# Patient Record
Sex: FEMALE | Race: WHITE
[De-identification: names, ages, dates, MRNs, and addresses within clinical notes are randomized per-mention and may not be internally consistent; named-entity substitution may affect disease eponyms.]

---

## 2018-10-09 ENCOUNTER — HOSPITAL ENCOUNTER (EMERGENCY)
Dept: HOSPITAL 47 - EC | Age: 33
Discharge: HOME | End: 2018-10-09
Payer: COMMERCIAL

## 2018-10-09 VITALS
HEART RATE: 58 BPM | SYSTOLIC BLOOD PRESSURE: 102 MMHG | TEMPERATURE: 96.8 F | DIASTOLIC BLOOD PRESSURE: 56 MMHG | RESPIRATION RATE: 16 BRPM

## 2018-10-09 DIAGNOSIS — Z87.42: ICD-10-CM

## 2018-10-09 DIAGNOSIS — L30.9: ICD-10-CM

## 2018-10-09 DIAGNOSIS — Z88.2: ICD-10-CM

## 2018-10-09 DIAGNOSIS — N64.52: Primary | ICD-10-CM

## 2018-10-09 DIAGNOSIS — F17.200: ICD-10-CM

## 2018-10-09 DIAGNOSIS — M79.662: ICD-10-CM

## 2018-10-09 DIAGNOSIS — Z90.710: ICD-10-CM

## 2018-10-09 DIAGNOSIS — Z88.1: ICD-10-CM

## 2018-10-09 LAB
ANION GAP SERPL CALC-SCNC: 10 MMOL/L
BASOPHILS # BLD AUTO: 0 K/UL (ref 0–0.2)
BASOPHILS NFR BLD AUTO: 0 %
BUN SERPL-SCNC: 12 MG/DL (ref 7–17)
CALCIUM SPEC-MCNC: 10.1 MG/DL (ref 8.4–10.2)
CHLORIDE SERPL-SCNC: 109 MMOL/L (ref 98–107)
CO2 SERPL-SCNC: 23 MMOL/L (ref 22–30)
EOSINOPHIL # BLD AUTO: 0.2 K/UL (ref 0–0.7)
EOSINOPHIL NFR BLD AUTO: 3 %
ERYTHROCYTE [DISTWIDTH] IN BLOOD BY AUTOMATED COUNT: 4.73 M/UL (ref 3.8–5.4)
ERYTHROCYTE [DISTWIDTH] IN BLOOD: 13.7 % (ref 11.5–15.5)
GLUCOSE SERPL-MCNC: 86 MG/DL (ref 74–99)
HCT VFR BLD AUTO: 42.1 % (ref 34–46)
HGB BLD-MCNC: 13.8 GM/DL (ref 11.4–16)
HYALINE CASTS UR QL AUTO: 2 /LPF (ref 0–2)
LYMPHOCYTES # SPEC AUTO: 2.5 K/UL (ref 1–4.8)
LYMPHOCYTES NFR SPEC AUTO: 48 %
MCH RBC QN AUTO: 29.2 PG (ref 25–35)
MCHC RBC AUTO-ENTMCNC: 32.8 G/DL (ref 31–37)
MCV RBC AUTO: 89 FL (ref 80–100)
MONOCYTES # BLD AUTO: 0.3 K/UL (ref 0–1)
MONOCYTES NFR BLD AUTO: 6 %
NEUTROPHILS # BLD AUTO: 2.2 K/UL (ref 1.3–7.7)
NEUTROPHILS NFR BLD AUTO: 42 %
PH UR: 7 [PH] (ref 5–8)
PLATELET # BLD AUTO: 255 K/UL (ref 150–450)
POTASSIUM SERPL-SCNC: 4.2 MMOL/L (ref 3.5–5.1)
RBC UR QL: <1 /HPF (ref 0–5)
SODIUM SERPL-SCNC: 142 MMOL/L (ref 137–145)
SP GR UR: 1 (ref 1–1.03)
SQUAMOUS UR QL AUTO: 11 /HPF (ref 0–4)
UROBILINOGEN UR QL STRIP: <2 MG/DL (ref ?–2)
WBC # BLD AUTO: 5.2 K/UL (ref 3.8–10.6)
WBC #/AREA URNS HPF: 2 /HPF (ref 0–5)

## 2018-10-09 PROCEDURE — 87070 CULTURE OTHR SPECIMN AEROBIC: CPT

## 2018-10-09 PROCEDURE — 81001 URINALYSIS AUTO W/SCOPE: CPT

## 2018-10-09 PROCEDURE — 80306 DRUG TEST PRSMV INSTRMNT: CPT

## 2018-10-09 PROCEDURE — 96372 THER/PROPH/DIAG INJ SC/IM: CPT

## 2018-10-09 PROCEDURE — 93971 EXTREMITY STUDY: CPT

## 2018-10-09 PROCEDURE — 87205 SMEAR GRAM STAIN: CPT

## 2018-10-09 PROCEDURE — 99284 EMERGENCY DEPT VISIT MOD MDM: CPT

## 2018-10-09 PROCEDURE — 36415 COLL VENOUS BLD VENIPUNCTURE: CPT

## 2018-10-09 PROCEDURE — 80048 BASIC METABOLIC PNL TOTAL CA: CPT

## 2018-10-09 PROCEDURE — 81025 URINE PREGNANCY TEST: CPT

## 2018-10-09 PROCEDURE — 85025 COMPLETE CBC W/AUTO DIFF WBC: CPT

## 2018-10-09 PROCEDURE — 84146 ASSAY OF PROLACTIN: CPT
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
## 2018-10-09 NOTE — US
EXAMINATION TYPE: US venous doppler duplex LE LT

 

DATE OF EXAM: 10/9/2018 3:36 PM

 

COMPARISON: NONE

 

CLINICAL HISTORY: Pain. discoloration and mild swelling on anterior left shin

 

SIDE PERFORMED: Left  

 

TECHNIQUE:  The lower extremity deep venous system is examined utilizing real time linear array sonog
tigist with graded compression, doppler sonography and color-flow sonography.

 

VESSELS IMAGED:

External Iliac Vein (EIV)

Common Femoral Vein

Deep Femoral Vein

Greater Saphenous Vein *

Femoral Vein

Popliteal Vein

Small Saphenous Vein *

Proximal Calf Veins

(* superficial vessels)

 

 

Left Leg:  Appears negative for DVT, scanned anterior left shin and no obvious abnormality seen. 

 

Grayscale, color doppler, spectral doppler imaging performed of the deep veins of the left lower extr
emity.  There is normal flow, compressibility, vascular waveforms.  Towards end of the exam anterior 
shin shows mild diffuse subcutaneous edema without suspicious focal fluid collection or mass.

 

 

IMPRESSION:  No ultrasound evidence for acute DVT in the left lower extremity.

## 2018-10-25 ENCOUNTER — HOSPITAL ENCOUNTER (EMERGENCY)
Dept: HOSPITAL 47 - EC | Age: 33
Discharge: HOME | End: 2018-10-25
Payer: COMMERCIAL

## 2018-10-25 VITALS — DIASTOLIC BLOOD PRESSURE: 64 MMHG | TEMPERATURE: 97.5 F | HEART RATE: 64 BPM | SYSTOLIC BLOOD PRESSURE: 102 MMHG

## 2018-10-25 VITALS — RESPIRATION RATE: 18 BRPM

## 2018-10-25 DIAGNOSIS — R52: ICD-10-CM

## 2018-10-25 DIAGNOSIS — G62.9: ICD-10-CM

## 2018-10-25 DIAGNOSIS — Z88.2: ICD-10-CM

## 2018-10-25 DIAGNOSIS — F17.200: ICD-10-CM

## 2018-10-25 DIAGNOSIS — Z88.6: ICD-10-CM

## 2018-10-25 DIAGNOSIS — R53.1: Primary | ICD-10-CM

## 2018-10-25 LAB
ANION GAP SERPL CALC-SCNC: 9 MMOL/L
BASOPHILS # BLD AUTO: 0.1 K/UL (ref 0–0.2)
BASOPHILS NFR BLD AUTO: 1 %
BUN SERPL-SCNC: 18 MG/DL (ref 7–17)
CALCIUM SPEC-MCNC: 10.1 MG/DL (ref 8.4–10.2)
CHLORIDE SERPL-SCNC: 105 MMOL/L (ref 98–107)
CO2 SERPL-SCNC: 28 MMOL/L (ref 22–30)
EOSINOPHIL # BLD AUTO: 0.1 K/UL (ref 0–0.7)
EOSINOPHIL NFR BLD AUTO: 1 %
ERYTHROCYTE [DISTWIDTH] IN BLOOD BY AUTOMATED COUNT: 4.72 M/UL (ref 3.8–5.4)
ERYTHROCYTE [DISTWIDTH] IN BLOOD: 13.2 % (ref 11.5–15.5)
GLUCOSE SERPL-MCNC: 115 MG/DL (ref 74–99)
HCT VFR BLD AUTO: 41.9 % (ref 34–46)
HGB BLD-MCNC: 13.9 GM/DL (ref 11.4–16)
LYMPHOCYTES # SPEC AUTO: 2.6 K/UL (ref 1–4.8)
LYMPHOCYTES NFR SPEC AUTO: 31 %
MAGNESIUM SPEC-SCNC: 2.1 MG/DL (ref 1.6–2.3)
MCH RBC QN AUTO: 29.4 PG (ref 25–35)
MCHC RBC AUTO-ENTMCNC: 33.1 G/DL (ref 31–37)
MCV RBC AUTO: 88.6 FL (ref 80–100)
MONOCYTES # BLD AUTO: 0.3 K/UL (ref 0–1)
MONOCYTES NFR BLD AUTO: 4 %
NEUTROPHILS # BLD AUTO: 5.3 K/UL (ref 1.3–7.7)
NEUTROPHILS NFR BLD AUTO: 63 %
PH UR: 6.5 [PH] (ref 5–8)
PLATELET # BLD AUTO: 239 K/UL (ref 150–450)
POTASSIUM SERPL-SCNC: 3.9 MMOL/L (ref 3.5–5.1)
SODIUM SERPL-SCNC: 142 MMOL/L (ref 137–145)
SP GR UR: 1.01 (ref 1–1.03)
UROBILINOGEN UR QL STRIP: <2 MG/DL (ref ?–2)
WBC # BLD AUTO: 8.4 K/UL (ref 3.8–10.6)

## 2018-10-25 PROCEDURE — 36415 COLL VENOUS BLD VENIPUNCTURE: CPT

## 2018-10-25 PROCEDURE — 99285 EMERGENCY DEPT VISIT HI MDM: CPT

## 2018-10-25 PROCEDURE — 80048 BASIC METABOLIC PNL TOTAL CA: CPT

## 2018-10-25 PROCEDURE — 83735 ASSAY OF MAGNESIUM: CPT

## 2018-10-25 PROCEDURE — 81003 URINALYSIS AUTO W/O SCOPE: CPT

## 2018-10-25 PROCEDURE — 81025 URINE PREGNANCY TEST: CPT

## 2018-10-25 PROCEDURE — 85025 COMPLETE CBC W/AUTO DIFF WBC: CPT

## 2018-10-25 NOTE — ED
General Adult HPI





- General


Chief complaint: Weakness


Stated complaint: weakness


Source: patient


Mode of arrival: wheelchair


Limitations: no limitations





- History of Present Illness


Initial comments: 





Dictation was produced using dragon dictation software. please excuse any 

grammatical, word or spelling errors. 





Chief Complaint: 32-year-old female past medical history of seizure disorder 

neuropathy presents with total body pain.





History of Present Illness: Patient is a 32-year-old female presents with total 

body pain.  Patient states she has past medical history of seizure and 

neuropathy.  Patient states she takes 100 mg of gabapentin 3 times a day and 

100 mg of Lamictal once a day.  Patient states she just moved from [place] 

2 weeks ago.  She states she has not had a chance to refill her medications.  

Patient denies any constitutional symptoms.








The ROS documented in this emergency department record has been reviewed and 

confirmed by me.  Those systems with pertinent positive or negative responses 

have been documented in the HPI.  All other systems are other negative and/or 

noncontributory.





- Related Data


 Previous Rx's











 Medication  Instructions  Recorded


 


Gabapentin 800 mg PO TID #12 tab 10/25/18


 


lamoTRIgine [LaMICtal] 100 mg PO BID #10 tab 10/25/18











 Allergies











Allergy/AdvReac Type Severity Reaction Status Date / Time


 


aspirin Allergy  Rash/Hives Verified 10/25/18 19:24


 


ibuprofen [From Motrin IB] Allergy  Rash/Hives Verified 10/25/18 19:24


 


ketorolac [From Toradol] Allergy  Rash/Hives Verified 10/25/18 19:24


 


sulfamethoxazole Allergy  Rash/Hives Verified 10/25/18 19:23





[From Bactrim]     


 


trimethoprim [From Bactrim] Allergy  Rash/Hives Verified 10/25/18 19:23














Review of Systems


ROS Statement: 


Those systems with pertinent positive or pertinent negative responses have been 

documented in the HPI.





ROS Other: All systems not noted in ROS Statement are negative.





Past Medical History


Past Medical History: Seizure Disorder


Additional Past Medical History / Comment(s): endometriosis chronic kidney 

infections


History of Any Multi-Drug Resistant Organisms: None Reported


Past Surgical History: Hysterectomy


Past Psychological History: No Psychological Hx Reported


Smoking Status: Current every day smoker


Past Alcohol Use History: Occasional


Past Drug Use History: Marijuana





General Exam





- General Exam Comments


Initial Comments: 














PHYSICAL EXAM:


General Impression: Alert and oriented x3, not in acute distress


HEENT: Normocephalic atraumatic, extra-ocular movements intact, pupils equal 

and reactive to light bilaterally, mucous membranes moist.


Cardiovascular: Heart regular rate and rhythm, S1&S2 audible, no murmurs, rubs 

or gallops


Chest: Lungs clear to auscultation bilaterally, no rhonchi, no wheeze, no rales


Abdomen: Bowel sounds present, abdomen soft, non-tender, non-distended, no 

organomegaly


Musculoskeletal: Pulses present and equal in all extremities, no peripheral 

edema


Motor: Power 5/5 bilaterally, no focal deficits noted


Neurological: CN II-XII grossly intact, no focal motor or sensory deficits noted


Skin: Intact with no visualized rashes


Psych: Normal affect and mood


Limitations: no limitations





Course





 Vital Signs











  10/25/18 10/25/18





  18:33 21:18


 


Temperature 98.4 F 98.1 F


 


Pulse Rate 84 67


 


Respiratory 16 18





Rate  


 


Blood Pressure 100/68 110/84


 


O2 Sat by Pulse 99 99





Oximetry  














Medical Decision Making





- Medical Decision Making











ED course: 33 Old female presents with whole body pain.  Vital signs upon 

arrival are within acceptable limits.  Patient's history of present illness is 

not suspicious for any life-threatening emergencies.  Hemodynamically stable on 

arrival.  Patient is well-appearing.  Laboratory evaluation obtained showing no 

acute processes.  Patient is not pregnant.  Patient's dictations refilled.  She 

is given referral to primary care physician and neurologist.








- Lab Data


Result diagrams: 


 10/25/18 19:40





 10/25/18 19:40





 Lab Results











  10/25/18 10/25/18 10/25/18 Range/Units





  19:40 19:40 20:37 


 


WBC   8.4   (3.8-10.6)  k/uL


 


RBC   4.72   (3.80-5.40)  m/uL


 


Hgb   13.9   (11.4-16.0)  gm/dL


 


Hct   41.9   (34.0-46.0)  %


 


MCV   88.6   (80.0-100.0)  fL


 


MCH   29.4   (25.0-35.0)  pg


 


MCHC   33.1   (31.0-37.0)  g/dL


 


RDW   13.2   (11.5-15.5)  %


 


Plt Count   239   (150-450)  k/uL


 


Neutrophils %   63   %


 


Lymphocytes %   31   %


 


Monocytes %   4   %


 


Eosinophils %   1   %


 


Basophils %   1   %


 


Neutrophils #   5.3   (1.3-7.7)  k/uL


 


Lymphocytes #   2.6   (1.0-4.8)  k/uL


 


Monocytes #   0.3   (0-1.0)  k/uL


 


Eosinophils #   0.1   (0-0.7)  k/uL


 


Basophils #   0.1   (0-0.2)  k/uL


 


Sodium  142    (137-145)  mmol/L


 


Potassium  3.9    (3.5-5.1)  mmol/L


 


Chloride  105    ()  mmol/L


 


Carbon Dioxide  28    (22-30)  mmol/L


 


Anion Gap  9    mmol/L


 


BUN  18 H    (7-17)  mg/dL


 


Creatinine  0.73    (0.52-1.04)  mg/dL


 


Est GFR (CKD-EPI)AfAm  >90    (>60 ml/min/1.73 sqM)  


 


Est GFR (CKD-EPI)NonAf  >90    (>60 ml/min/1.73 sqM)  


 


Glucose  115 H    (74-99)  mg/dL


 


Calcium  10.1    (8.4-10.2)  mg/dL


 


Magnesium  2.1    (1.6-2.3)  mg/dL


 


Urine Color     


 


Urine Appearance     (Clear)  


 


Urine pH     (5.0-8.0)  


 


Ur Specific Gravity     (1.001-1.035)  


 


Urine Protein     (Negative)  


 


Urine Glucose (UA)     (Negative)  


 


Urine Ketones     (Negative)  


 


Urine Blood     (Negative)  


 


Urine Nitrite     (Negative)  


 


Urine Bilirubin     (Negative)  


 


Urine Urobilinogen     (<2.0)  mg/dL


 


Ur Leukocyte Esterase     (Negative)  


 


Urine HCG, Qual    Not Detected  (Not Detectd)  














  10/25/18 Range/Units





  20:37 


 


WBC   (3.8-10.6)  k/uL


 


RBC   (3.80-5.40)  m/uL


 


Hgb   (11.4-16.0)  gm/dL


 


Hct   (34.0-46.0)  %


 


MCV   (80.0-100.0)  fL


 


MCH   (25.0-35.0)  pg


 


MCHC   (31.0-37.0)  g/dL


 


RDW   (11.5-15.5)  %


 


Plt Count   (150-450)  k/uL


 


Neutrophils %   %


 


Lymphocytes %   %


 


Monocytes %   %


 


Eosinophils %   %


 


Basophils %   %


 


Neutrophils #   (1.3-7.7)  k/uL


 


Lymphocytes #   (1.0-4.8)  k/uL


 


Monocytes #   (0-1.0)  k/uL


 


Eosinophils #   (0-0.7)  k/uL


 


Basophils #   (0-0.2)  k/uL


 


Sodium   (137-145)  mmol/L


 


Potassium   (3.5-5.1)  mmol/L


 


Chloride   ()  mmol/L


 


Carbon Dioxide   (22-30)  mmol/L


 


Anion Gap   mmol/L


 


BUN   (7-17)  mg/dL


 


Creatinine   (0.52-1.04)  mg/dL


 


Est GFR (CKD-EPI)AfAm   (>60 ml/min/1.73 sqM)  


 


Est GFR (CKD-EPI)NonAf   (>60 ml/min/1.73 sqM)  


 


Glucose   (74-99)  mg/dL


 


Calcium   (8.4-10.2)  mg/dL


 


Magnesium   (1.6-2.3)  mg/dL


 


Urine Color  Light Yellow  


 


Urine Appearance  Clear  (Clear)  


 


Urine pH  6.5  (5.0-8.0)  


 


Ur Specific Gravity  1.008  (1.001-1.035)  


 


Urine Protein  Negative  (Negative)  


 


Urine Glucose (UA)  Negative  (Negative)  


 


Urine Ketones  Negative  (Negative)  


 


Urine Blood  Negative  (Negative)  


 


Urine Nitrite  Negative  (Negative)  


 


Urine Bilirubin  Negative  (Negative)  


 


Urine Urobilinogen  <2.0  (<2.0)  mg/dL


 


Ur Leukocyte Esterase  Negative  (Negative)  


 


Urine HCG, Qual   (Not Detectd)  














Disposition


Clinical Impression: 


 Inadequate pain control





Disposition: HOME SELF-CARE


Condition: Good


Prescriptions: 


Gabapentin 800 mg PO TID #12 tab


lamoTRIgine [LaMICtal] 100 mg PO BID #10 tab


Is patient prescribed a controlled substance at d/c from ED?: No


Referrals: 


None,Stated [Primary Care Provider] - 1-2 days


Partha Duggan MD [REFERRING] - 1-2 days


Fer Brady MD [STAFF PHYSICIAN] - 1-2 days


Time of Disposition: 21:45

## 2018-12-24 ENCOUNTER — HOSPITAL ENCOUNTER (EMERGENCY)
Dept: HOSPITAL 47 - EC | Age: 33
Discharge: HOME | End: 2018-12-24
Payer: COMMERCIAL

## 2018-12-24 VITALS — SYSTOLIC BLOOD PRESSURE: 95 MMHG | RESPIRATION RATE: 18 BRPM | HEART RATE: 73 BPM | DIASTOLIC BLOOD PRESSURE: 57 MMHG

## 2018-12-24 VITALS — TEMPERATURE: 97.4 F

## 2018-12-24 DIAGNOSIS — J40: Primary | ICD-10-CM

## 2018-12-24 DIAGNOSIS — F17.200: ICD-10-CM

## 2018-12-24 DIAGNOSIS — Z88.2: ICD-10-CM

## 2018-12-24 DIAGNOSIS — J32.9: ICD-10-CM

## 2018-12-24 DIAGNOSIS — Z88.6: ICD-10-CM

## 2018-12-24 LAB
PH UR: 6 [PH] (ref 5–8)
SP GR UR: 1.02 (ref 1–1.03)
UROBILINOGEN UR QL STRIP: <2 MG/DL (ref ?–2)

## 2018-12-24 PROCEDURE — 81003 URINALYSIS AUTO W/O SCOPE: CPT

## 2018-12-24 PROCEDURE — 99284 EMERGENCY DEPT VISIT MOD MDM: CPT

## 2018-12-24 PROCEDURE — 94640 AIRWAY INHALATION TREATMENT: CPT

## 2018-12-24 PROCEDURE — 87502 INFLUENZA DNA AMP PROBE: CPT

## 2018-12-24 PROCEDURE — 71046 X-RAY EXAM CHEST 2 VIEWS: CPT

## 2018-12-24 PROCEDURE — 76857 US EXAM PELVIC LIMITED: CPT

## 2018-12-24 PROCEDURE — 74018 RADEX ABDOMEN 1 VIEW: CPT

## 2018-12-24 PROCEDURE — 87081 CULTURE SCREEN ONLY: CPT

## 2018-12-24 PROCEDURE — 87430 STREP A AG IA: CPT

## 2018-12-24 PROCEDURE — 87086 URINE CULTURE/COLONY COUNT: CPT

## 2018-12-24 NOTE — XR
EXAMINATION TYPE: XR KUB

 

DATE OF EXAM: 12/24/2018

 

CLINICAL DATA:  33-year-old female with pain, PHH

 

COMPARISON:  None

 

FINDINGS:

 

Lung bases are clear. 

 

No evidence for free intraperitoneal air. 

 

No dilated small bowel or air-fluid levels. Scattered air and stool seen throughout the colon extendi
ng distally into the rectum. Mild overall stool burden. 

 

No suspicious calcifications identified.

 

 

 

IMPRESSION:

 

Mild overall stool burden. No evidence of bowel obstruction or free intraperitoneal air.

## 2018-12-24 NOTE — XR
EXAMINATION TYPE: XR chest 2V

 

DATE OF EXAM: 12/24/2018

 

COMPARISON: None

 

HISTORY: 33-year-old female with cough

 

TECHNIQUE:  PA and lateral views

 

FINDINGS:  

The cardiomediastinal silhouette, aorta, and pulmonary vasculature are within normal limits. Minimal 
central peribronchial cuffing. Otherwise, lungs and pleural spaces are clear.

 

 

IMPRESSION:  

Very minimal central peribronchial cuffing could reflect bronchitis or asthma. Otherwise, no acute ca
rdiopulmonary process.

## 2018-12-24 NOTE — US
EXAMINATION TYPE: US pelvic limited

 

DATE OF EXAM: 12/24/2018

 

COMPARISON: NONE

 

CLINICAL HISTORY: 33-year-old female Pain. Patient states she feels pressure and something pressing i
n her pelvis.

 

TECHNIQUE:  Transabdominal (TA).  

 

Date of LMP:  hysterectomy 4 years ago

 

FINDINGS:

 

Uterus and ovaries are surgically absent.

 

Sonographer notes: Bilateral Adnexa:  Extensive bowel noted bilaterally

 

Posterior cul-de-sac:  wnl

 

 

 

IMPRESSION: 

Status post hysterectomy and bilateral salpingo-oophorectomy. Extensive bowel noted in the bilateral 
adnexa by the sonographer. No pelvic free fluid.

## 2018-12-24 NOTE — ED
General Adult HPI





- General


Chief complaint: Upper Respiratory Infection


Stated complaint: Congestion


Time Seen by Provider: 12/24/18 11:14


Source: patient, RN notes reviewed


Mode of arrival: ambulatory


Limitations: no limitations





- History of Present Illness


Initial comments: 





Patient is a pleasant 33-year-old female presenting to the emergency department 

complaints of cough and upper a story symptoms.  Symptoms have been occurring 

over the past 2-3 days.  Patient has sinus congestion.  Patient does have cough 

with yellow sputum.  Patient does have mild sore throat.  Patient does have 

some suprapubic fullness.  Patient states this is chronic and has been present 

for greater than 1 year.  Patient does have some urinary urgency and mild 

dysuria which she has had previously with urinary tract infections.





- Related Data


 Previous Rx's











 Medication  Instructions  Recorded


 


Gabapentin 800 mg PO TID #12 tab 10/25/18


 


lamoTRIgine [LaMICtal] 100 mg PO BID #10 tab 10/25/18


 


Albuterol Inhaler [Ventolin Hfa 2 puff INHALATION Q4HR PRN #1 12/24/18





Inhaler] inhaler 


 


Azithromycin [Zithromax Z-pack] 250 mg PO AS DIRECTED #6 tab 12/24/18











 Allergies











Allergy/AdvReac Type Severity Reaction Status Date / Time


 


aspirin Allergy  Rash/Hives Verified 12/24/18 11:07


 


ibuprofen [From Motrin IB] Allergy  Rash/Hives Verified 12/24/18 11:07


 


ketorolac [From Toradol] Allergy  Rash/Hives Verified 12/24/18 11:07


 


sulfamethoxazole Allergy  Rash/Hives Verified 12/24/18 11:07





[From Bactrim]     


 


trimethoprim [From Bactrim] Allergy  Rash/Hives Verified 12/24/18 11:07














Review of Systems


ROS Statement: 


Those systems with pertinent positive or pertinent negative responses have been 

documented in the HPI.





ROS Other: All systems not noted in ROS Statement are negative.


Constitutional: Denies: fever


Eyes: Denies: eye pain


ENT: Reports: throat pain, congestion.  Denies: ear pain


Respiratory: Reports: cough.  Denies: dyspnea


Cardiovascular: Denies: chest pain


Endocrine: Denies: fatigue


Gastrointestinal: Reports: as per HPI


Genitourinary: Reports: urgency, dysuria


Musculoskeletal: Denies: back pain


Skin: Denies: rash


Neurological: Denies: weakness





Past Medical History


Past Medical History: Seizure Disorder


Additional Past Medical History / Comment(s): endometriosis chronic kidney 

infections


History of Any Multi-Drug Resistant Organisms: None Reported


Past Surgical History: Hysterectomy


Past Psychological History: No Psychological Hx Reported


Smoking Status: Current every day smoker


Past Alcohol Use History: Occasional


Past Drug Use History: Marijuana





General Exam


Limitations: no limitations


General appearance: alert, in no apparent distress


Head exam: Present: atraumatic


Eye exam: Present: normal appearance, PERRL


ENT exam: Present: other (Mild pharyngeal erythema)


Neck exam: Present: normal inspection.  Absent: tenderness, meningismus


Respiratory exam: Present: wheezes (Minimal wheezing)


Cardiovascular Exam: Present: regular rate, normal rhythm


GI/Abdominal exam: Present: soft.  Absent: tenderness


Extremities exam: Present: normal inspection.  Absent: pedal edema, calf 

tenderness


Back exam: Present: normal inspection


Neurological exam: Present: alert


Psychiatric exam: Present: normal affect, normal mood


Skin exam: Present: normal color





Course


 Vital Signs











  12/24/18 12/24/18 12/24/18





  11:05 12:01 12:12


 


Temperature 97.4 F L  


 


Pulse Rate 79 80 80


 


Respiratory 20  





Rate   


 


Blood Pressure 109/66  


 


O2 Sat by Pulse 99  





Oximetry   














Medical Decision Making





- Medical Decision Making





Patient reevaluated and resting comfortably in bed.  Patient does feel much 

better following nebulizer.  Patient updated on results and plan.  Patient 

requests antibiotics for sinuses.





- Lab Data


 Lab Results











  12/24/18 12/24/18 12/24/18 Range/Units





  11:43 11:45 11:45 


 


Urine Color  Yellow    


 


Urine Appearance  Clear    (Clear)  


 


Urine pH  6.0    (5.0-8.0)  


 


Ur Specific Gravity  1.016    (1.001-1.035)  


 


Urine Protein  Trace H    (Negative)  


 


Urine Glucose (UA)  Negative    (Negative)  


 


Urine Ketones  Negative    (Negative)  


 


Urine Blood  Negative    (Negative)  


 


Urine Nitrite  Negative    (Negative)  


 


Urine Bilirubin  Negative    (Negative)  


 


Urine Urobilinogen  <2.0    (<2.0)  mg/dL


 


Ur Leukocyte Esterase  Negative    (Negative)  


 


Influenza Type A RNA   Not Detected   (Not Detectd)  


 


Influenza Type B (PCR)   Not Detected   (Not Detectd)  


 


Group A Strep Rapid    Negative  (Negative)  














- Radiology Data


Radiology results: report reviewed (Ultrasound shows no acute abnormality.  

Bowel noted extensively bilateral adnexal region.  Status post hysterectomy), 

image reviewed (Chest x-ray shows some peribronchial cuffing, otherwise no 

acute abnormality.  Abdominal x-ray shows no acute abnormality. )





Disposition


Clinical Impression: 


 Sinusitis, Bronchitis





Disposition: HOME SELF-CARE


Condition: Stable


Instructions:  Sinusitis (ED), Acute Bronchitis (ED)


Additional Instructions: 


Please follow-up with primary care physician in the next day or 2 for recheck.  

Return for difficulty breathing, uncontrolled fever, increased abdominal pain, 

worsening or changing symptoms or other concerns.


Prescriptions: 


Albuterol Inhaler [Ventolin Hfa Inhaler] 2 puff INHALATION Q4HR PRN #1 inhaler


 PRN Reason: Dyspnea


Azithromycin [Zithromax Z-pack] 250 mg PO AS DIRECTED #6 tab


Is patient prescribed a controlled substance at d/c from ED?: No


Referrals: 


Brianne Mobley MD [STAFF PHYSICIAN] - 1-2 days


Time of Disposition: 14:06